# Patient Record
Sex: MALE | Race: WHITE | NOT HISPANIC OR LATINO | Employment: FULL TIME | URBAN - METROPOLITAN AREA
[De-identification: names, ages, dates, MRNs, and addresses within clinical notes are randomized per-mention and may not be internally consistent; named-entity substitution may affect disease eponyms.]

---

## 2022-10-21 ENCOUNTER — TELEPHONE (OUTPATIENT)
Dept: DERMATOLOGY | Facility: CLINIC | Age: 52
End: 2022-10-21

## 2022-10-21 ENCOUNTER — OFFICE VISIT (OUTPATIENT)
Dept: URGENT CARE | Facility: CLINIC | Age: 52
End: 2022-10-21
Payer: COMMERCIAL

## 2022-10-21 VITALS
HEART RATE: 102 BPM | OXYGEN SATURATION: 97 % | HEIGHT: 68 IN | BODY MASS INDEX: 29.49 KG/M2 | SYSTOLIC BLOOD PRESSURE: 135 MMHG | DIASTOLIC BLOOD PRESSURE: 85 MMHG | WEIGHT: 194.6 LBS | TEMPERATURE: 97.3 F | RESPIRATION RATE: 18 BRPM

## 2022-10-21 DIAGNOSIS — Z09 FOLLOW-UP TREATMENT: ICD-10-CM

## 2022-10-21 DIAGNOSIS — M75.91 LESION OF RIGHT SHOULDER: Primary | ICD-10-CM

## 2022-10-21 PROCEDURE — 99213 OFFICE O/P EST LOW 20 MIN: CPT | Performed by: NURSE PRACTITIONER

## 2022-10-21 NOTE — PROGRESS NOTES
[address] [facility]        NAME: Jermaine Soto is a 47 y o  male  : 1970    MRN: [id number]  DATE: 2022  TIME: 8:58 AM    Assessment and Plan   Lesion of right shoulder [M75 [id number]]  1  Lesion of right shoulder  Ambulatory Referral to Family Practice    Ambulatory Referral to Dermatology    Ambulatory Referral to General Surgery   2  Follow-up treatment           Patient Instructions       Follow up with PCP in 3-5 days  Proceed to  ER if symptoms worsen  You are not to attempt to remove the lesion yourself  This requires professional removal and biopsy  No wart remover  You are to find a family doctor  Call a dermatologist - if it is a long time for you to get in; call the surgeons on the list I have given you  This should not wait as you state it is growing  Follow up as instructed  You are to go to the ED if symptoms worsen - fevers change in color of lesion or skin  Chief Complaint     Chief Complaint   Patient presents with   • Wound Check     Right shoulder lump for 2 5 months         History of Present Illness       This is a 47year old male who states that he has a lesion on the right deltoid for the last 2 months and states he has noted it is getting bigger  He is concerned it is a wart  He has not attempted to remove it and has not applied any wart remover to it  He denies hx of skin cancer and denies prolonged time in the sun  Review of Systems   Review of Systems   Constitutional: Negative  HENT: Negative  Eyes: Negative  Respiratory: Negative  Cardiovascular: Negative  Gastrointestinal: Negative  Endocrine: Negative  Genitourinary: Negative  Musculoskeletal: Negative  Skin: Positive for wound  Allergic/Immunologic: Negative  Neurological: Negative  Hematological: Negative  Psychiatric/Behavioral: Negative  Current Medications     No current outpatient medications on file      Current Allergies Allergies as of 10/21/2022   • (No Known Allergies)            The following portions of the patient's history were reviewed and updated as appropriate: allergies, current medications, past family history, past medical history, past social history, past surgical history and problem list      History reviewed  No pertinent past medical history  History reviewed  No pertinent surgical history  History reviewed  No pertinent family history  Medications have been verified  Objective   /85   Pulse 102   Temp (!) 97 3 °F (36 3 °C)   Resp 18   Ht 5' 8" (1 727 m)   Wt 88 3 kg (194 lb 9 6 oz)   SpO2 97%   BMI 29 59 kg/m²   No LMP for male patient  Physical Exam     Physical Exam  Vitals and nursing note reviewed  Constitutional:       General: He is not in acute distress  Appearance: Normal appearance  He is normal weight  He is not ill-appearing, toxic-appearing or diaphoretic  HENT:      Head: Normocephalic and atraumatic  Eyes:      Extraocular Movements: Extraocular movements intact  Cardiovascular:      Rate and Rhythm: Normal rate and regular rhythm  Pulses: Normal pulses  Heart sounds: Normal heart sounds  Pulmonary:      Effort: Pulmonary effort is normal       Breath sounds: Normal breath sounds  Abdominal:      General: There is no distension  Palpations: Abdomen is soft  Tenderness: There is no abdominal tenderness  Musculoskeletal:         General: Normal range of motion  Cervical back: Normal range of motion and neck supple  Skin:     General: Skin is warm and dry  Capillary Refill: Capillary refill takes less than 2 seconds  Findings: Lesion present  Comments: Approximately 1 cm in diameter lesion on right deltoid  Lesion is raised, crater like appearance, slight irregular boarders  No necrosis  No drainage  No pain or TTP  Neurological:      General: No focal deficit present        Mental Status: He is alert and oriented to person, place, and time  Mental status is at baseline  Psychiatric:         Mood and Affect: Mood normal          Behavior: Behavior normal          Thought Content: Thought content normal          Judgment: Judgment normal                    DDX:  Melanoma, BSC, benign skin lesion, doubt wart

## 2022-10-21 NOTE — PATIENT INSTRUCTIONS
You are not to attempt to remove the lesion yourself  This requires professional removal and biopsy  No wart remover  You are to find a family doctor  Call a dermatologist - if it is a long time for you to get in; call the surgeons on the list I have given you  This should not wait as you state it is growing  Follow up as instructed  You are to go to the ED if symptoms worsen - fevers change in color of lesion or skin